# Patient Record
Sex: FEMALE | Race: WHITE | NOT HISPANIC OR LATINO | Employment: OTHER | URBAN - METROPOLITAN AREA
[De-identification: names, ages, dates, MRNs, and addresses within clinical notes are randomized per-mention and may not be internally consistent; named-entity substitution may affect disease eponyms.]

---

## 2022-04-19 ENCOUNTER — TELEPHONE (OUTPATIENT)
Dept: GASTROENTEROLOGY | Facility: CLINIC | Age: 52
End: 2022-04-19

## 2022-04-19 ENCOUNTER — OFFICE VISIT (OUTPATIENT)
Dept: GASTROENTEROLOGY | Facility: CLINIC | Age: 52
End: 2022-04-19

## 2022-04-19 VITALS
BODY MASS INDEX: 41.95 KG/M2 | SYSTOLIC BLOOD PRESSURE: 125 MMHG | HEIGHT: 70 IN | HEART RATE: 61 BPM | DIASTOLIC BLOOD PRESSURE: 79 MMHG | TEMPERATURE: 96.4 F | WEIGHT: 293 LBS

## 2022-04-19 DIAGNOSIS — R10.13 DYSPEPSIA: Primary | ICD-10-CM

## 2022-04-19 DIAGNOSIS — R14.0 BLOATING: ICD-10-CM

## 2022-04-19 PROCEDURE — 99204 OFFICE O/P NEW MOD 45 MIN: CPT | Performed by: INTERNAL MEDICINE

## 2022-04-19 RX ORDER — HYDROCHLOROTHIAZIDE 25 MG/1
1 TABLET ORAL
COMMUNITY
Start: 2022-04-19

## 2022-04-19 RX ORDER — BACILLUS COAGULANS/INULIN 1B-250 MG
CAPSULE ORAL
COMMUNITY

## 2022-04-19 RX ORDER — ROSUVASTATIN CALCIUM 20 MG/1
1 TABLET, COATED ORAL
COMMUNITY
Start: 2021-12-15

## 2022-04-19 RX ORDER — ICOSAPENT ETHYL 1000 MG/1
2 CAPSULE ORAL 2 TIMES DAILY WITH MEALS
COMMUNITY
Start: 2022-02-25

## 2022-04-19 RX ORDER — ASPIRIN 81 MG/1
1 TABLET, CHEWABLE ORAL
COMMUNITY

## 2022-04-19 RX ORDER — SERTRALINE HYDROCHLORIDE 100 MG/1
1 TABLET, FILM COATED ORAL
COMMUNITY
Start: 2021-12-15

## 2022-04-19 RX ORDER — DIPHENOXYLATE HYDROCHLORIDE AND ATROPINE SULFATE 2.5; .025 MG/1; MG/1
1 TABLET ORAL DAILY
COMMUNITY

## 2022-04-19 RX ORDER — PANTOPRAZOLE SODIUM 40 MG/1
40 TABLET, DELAYED RELEASE ORAL DAILY
Qty: 90 TABLET | Refills: 3 | Status: SHIPPED | OUTPATIENT
Start: 2022-04-19 | End: 2022-06-10 | Stop reason: SDUPTHER

## 2022-04-19 RX ORDER — METOPROLOL SUCCINATE 50 MG/1
1 TABLET, EXTENDED RELEASE ORAL NIGHTLY
COMMUNITY
Start: 2022-03-07

## 2022-04-19 RX ORDER — CETIRIZINE HYDROCHLORIDE 10 MG/1
CAPSULE, LIQUID FILLED ORAL
COMMUNITY

## 2022-04-19 NOTE — TELEPHONE ENCOUNTER
ROSE patient in office for EGD. Scheduled 06/13/2022 with arrival time 1:00pm. Prep packet handed to patient. Also advised arrival time may vary based on [facility] guidelines. ROSE [place]

## 2022-04-19 NOTE — PROGRESS NOTES
Chief Complaint   Patient presents with   • Abdominal Pain   • Nausea   • Heartburn     Ivania Mendoza is a 51 y.o. female who presents with a history of abdominal distention nausea and dyspepsia  HPI     Patient 51-year-old female with history of diagnosed sarcoidosis complaining of upper abdominal distention with bloating related to eating.  Patient reports no specific food causes the discomfort but almost anything she eats will cause the upper abdomen to become distended and tense with ongoing dyspepsia.  Patient noted with CT showing fatty liver with a single elevated ALT level.  No abdominal organ abnormalities found.  Colonoscopy in 2019 with benign polyps due for repeat in 2024.  Patient has noted some color change in her stool seems a little lighter than the normal brown but otherwise no change.    Past Medical History:   Diagnosis Date   • Sarcoidosis        Current Outpatient Medications:   •  Advair Diskus 250-50 MCG/DOSE DISKUS, , Disp: , Rfl:   •  aspirin 81 MG chewable tablet, Chew 1 tablet., Disp: , Rfl:   •  Bacillus Coagulans-Inulin (Probiotic) 1-250 BILLION-MG capsule, , Disp: , Rfl:   •  Cetirizine HCl (ZyrTEC Allergy) 10 MG capsule, Take  by mouth., Disp: , Rfl:   •  Cholecalciferol 25 MCG (1000 UT) capsule, Take 1 capsule by mouth Daily., Disp: , Rfl:   •  Coenzyme Q10 400 MG capsule, Take  by mouth., Disp: , Rfl:   •  Glycerin-Hypromellose- (ARTIFICIAL TEARS) 0.2-0.2-1 % solution ophthalmic solution, Apply  to eye(s) as directed by provider., Disp: , Rfl:   •  hydroCHLOROthiazide (HYDRODIURIL) 25 MG tablet, Take 1 tablet by mouth., Disp: , Rfl:   •  icosapent ethyl (VASCEPA) 1 g capsule capsule, Take 2 capsules by mouth 2 (Two) Times a Day With Meals., Disp: , Rfl:   •  metFORMIN (GLUCOPHAGE) 1000 MG tablet, Take 1 tablet by mouth., Disp: , Rfl:   •  metoprolol succinate XL (TOPROL-XL) 50 MG 24 hr tablet, Take 1 tablet by mouth., Disp: , Rfl:   •  multivitamin (THERAGRAN) tablet tablet,  Take 1 tablet by mouth Daily., Disp: , Rfl:   •  rosuvastatin (CRESTOR) 20 MG tablet, Take 1 tablet by mouth., Disp: , Rfl:   •  sertraline (ZOLOFT) 100 MG tablet, Take 1 tablet by mouth., Disp: , Rfl:   •  TURMERIC PO, Take  by mouth Daily., Disp: , Rfl:   •  pantoprazole (PROTONIX) 40 MG EC tablet, Take 1 tablet by mouth Daily., Disp: 90 tablet, Rfl: 3  No Known Allergies  Social History     Socioeconomic History   • Marital status:    Tobacco Use   • Smoking status: Former Smoker     Quit date:      Years since quittin.3   • Smokeless tobacco: Never Used   Substance and Sexual Activity   • Alcohol use: Never     Family History   Problem Relation Age of Onset   • Pancreatic cancer Father      Review of Systems   Constitutional: Negative.    HENT: Negative.    Eyes: Negative.    Respiratory: Negative.    Cardiovascular: Negative.    Gastrointestinal: Positive for abdominal distention and abdominal pain. Negative for anal bleeding, blood in stool, constipation, diarrhea, nausea, rectal pain and vomiting.   Endocrine: Negative.    Skin: Negative.    Allergic/Immunologic: Negative.    Hematological: Negative.      Vitals:    22 1232   BP: 125/79   Pulse: 61   Temp: 96.4 °F (35.8 °C)     Physical Exam  Vitals and nursing note reviewed.   Constitutional:       Appearance: Normal appearance. She is well-developed. She is obese.   HENT:      Head: Normocephalic and atraumatic.   Eyes:      General: No scleral icterus.     Pupils: Pupils are equal, round, and reactive to light.   Cardiovascular:      Rate and Rhythm: Normal rate and regular rhythm.      Heart sounds: Normal heart sounds. No murmur heard.    No friction rub. No gallop.   Pulmonary:      Effort: Pulmonary effort is normal.      Breath sounds: Normal breath sounds. No wheezing or rales.   Abdominal:      General: Bowel sounds are normal. There is no distension or abdominal bruit.      Palpations: Abdomen is soft. Abdomen is not rigid.  There is no shifting dullness, fluid wave, mass or pulsatile mass.      Tenderness: There is abdominal tenderness. There is no guarding.      Hernia: No hernia is present.   Musculoskeletal:         General: No swelling or tenderness. Normal range of motion.      Cervical back: Normal range of motion and neck supple. No rigidity.   Skin:     General: Skin is warm and dry.      Coloration: Skin is not jaundiced.      Findings: No rash.   Neurological:      General: No focal deficit present.      Mental Status: She is alert and oriented to person, place, and time.      Cranial Nerves: No cranial nerve deficit.   Psychiatric:         Behavior: Behavior normal.         Thought Content: Thought content normal.       Diagnoses and all orders for this visit:    Dyspepsia  -     Case Request; Standing  -     Case Request    Bloating    Other orders  -     aspirin 81 MG chewable tablet; Chew 1 tablet.  -     Bacillus Coagulans-Inulin (Probiotic) 1-250 BILLION-MG capsule  -     Cetirizine HCl (ZyrTEC Allergy) 10 MG capsule; Take  by mouth.  -     Cholecalciferol 25 MCG (1000 UT) capsule; Take 1 capsule by mouth Daily.  -     Coenzyme Q10 400 MG capsule; Take  by mouth.  -     Advair Diskus 250-50 MCG/DOSE DISKUS  -     Glycerin-Hypromellose- (ARTIFICIAL TEARS) 0.2-0.2-1 % solution ophthalmic solution; Apply  to eye(s) as directed by provider.  -     hydroCHLOROthiazide (HYDRODIURIL) 25 MG tablet; Take 1 tablet by mouth.  -     icosapent ethyl (VASCEPA) 1 g capsule capsule; Take 2 capsules by mouth 2 (Two) Times a Day With Meals.  -     metFORMIN (GLUCOPHAGE) 1000 MG tablet; Take 1 tablet by mouth.  -     metoprolol succinate XL (TOPROL-XL) 50 MG 24 hr tablet; Take 1 tablet by mouth.  -     multivitamin (THERAGRAN) tablet tablet; Take 1 tablet by mouth Daily.  -     rosuvastatin (CRESTOR) 20 MG tablet; Take 1 tablet by mouth.  -     sertraline (ZOLOFT) 100 MG tablet; Take 1 tablet by mouth.  -     TURMERIC PO; Take  by  mouth Daily.  -     pantoprazole (PROTONIX) 40 MG EC tablet; Take 1 tablet by mouth Daily.    Patient 51-year-old female with history of sarcoidosis complaining of upper abdominal bloating and discomfort.  Patient denies any real nausea but has occasional heartburn and reflux.  Patient denies any fevers or chills but does have a history of what was called GERD but recently taken off the PPI because of elevated renal function.  Patient reports renal function has not improved since stopping the PPI.  At this point would recommend proceeding with EGD but restart PPI pending endoscopy.  Patient has been on famotidine without improvement.  We will follow-up clinically with negative CT scan and a negative colonoscopy 2019 other issues unlikely.  We will follow-up clinically based on findings.

## 2022-06-07 ENCOUNTER — TRANSCRIBE ORDERS (OUTPATIENT)
Dept: ADMINISTRATIVE | Facility: HOSPITAL | Age: 52
End: 2022-06-07

## 2022-06-07 ENCOUNTER — TELEPHONE (OUTPATIENT)
Dept: GASTROENTEROLOGY | Facility: CLINIC | Age: 52
End: 2022-06-07

## 2022-06-07 DIAGNOSIS — Z01.818 PRE-OP TESTING: Primary | ICD-10-CM

## 2022-06-07 DIAGNOSIS — Z01.818 OTHER SPECIFIED PRE-OPERATIVE EXAMINATION: Primary | ICD-10-CM

## 2022-06-10 ENCOUNTER — LAB (OUTPATIENT)
Dept: LAB | Facility: HOSPITAL | Age: 52
End: 2022-06-10

## 2022-06-10 DIAGNOSIS — Z01.818 OTHER SPECIFIED PRE-OPERATIVE EXAMINATION: ICD-10-CM

## 2022-06-10 LAB — SARS-COV-2 ORF1AB RESP QL NAA+PROBE: NOT DETECTED

## 2022-06-10 PROCEDURE — U0004 COV-19 TEST NON-CDC HGH THRU: HCPCS

## 2022-06-10 PROCEDURE — C9803 HOPD COVID-19 SPEC COLLECT: HCPCS

## 2022-06-10 RX ORDER — NITROFURANTOIN 25; 75 MG/1; MG/1
100 CAPSULE ORAL
COMMUNITY
Start: 2022-04-25

## 2022-06-10 RX ORDER — LOSARTAN POTASSIUM 25 MG/1
25 TABLET ORAL NIGHTLY
COMMUNITY
Start: 2022-05-09

## 2022-06-10 RX ORDER — SERTRALINE HYDROCHLORIDE 100 MG/1
1 TABLET, FILM COATED ORAL DAILY
COMMUNITY
Start: 2022-06-03 | End: 2022-06-10 | Stop reason: SDUPTHER

## 2022-06-10 RX ORDER — PANTOPRAZOLE SODIUM 40 MG/1
1 TABLET, DELAYED RELEASE ORAL NIGHTLY
COMMUNITY
Start: 2022-04-19 | End: 2023-04-03

## 2022-06-13 ENCOUNTER — ANESTHESIA EVENT (OUTPATIENT)
Dept: GASTROENTEROLOGY | Facility: HOSPITAL | Age: 52
End: 2022-06-13

## 2022-06-13 ENCOUNTER — ANESTHESIA (OUTPATIENT)
Dept: GASTROENTEROLOGY | Facility: HOSPITAL | Age: 52
End: 2022-06-13

## 2022-06-13 ENCOUNTER — HOSPITAL ENCOUNTER (OUTPATIENT)
Facility: HOSPITAL | Age: 52
Setting detail: HOSPITAL OUTPATIENT SURGERY
Discharge: HOME | End: 2022-06-13
Attending: INTERNAL MEDICINE | Admitting: INTERNAL MEDICINE

## 2022-06-13 VITALS
OXYGEN SATURATION: 94 % | RESPIRATION RATE: 16 BRPM | SYSTOLIC BLOOD PRESSURE: 110 MMHG | WEIGHT: 293 LBS | BODY MASS INDEX: 41.95 KG/M2 | HEIGHT: 70 IN | HEART RATE: 63 BPM | DIASTOLIC BLOOD PRESSURE: 74 MMHG

## 2022-06-13 DIAGNOSIS — R10.13 DYSPEPSIA: ICD-10-CM

## 2022-06-13 LAB — GLUCOSE BLDC GLUCOMTR-MCNC: 127 MG/DL (ref 70–130)

## 2022-06-13 PROCEDURE — 25010000002 PROPOFOL 10 MG/ML EMULSION: Performed by: ANESTHESIOLOGY

## 2022-06-13 PROCEDURE — 82962 GLUCOSE BLOOD TEST: CPT

## 2022-06-13 PROCEDURE — 43239 EGD BIOPSY SINGLE/MULTIPLE: CPT | Performed by: INTERNAL MEDICINE

## 2022-06-13 PROCEDURE — 88342 IMHCHEM/IMCYTCHM 1ST ANTB: CPT | Performed by: INTERNAL MEDICINE

## 2022-06-13 PROCEDURE — 88305 TISSUE EXAM BY PATHOLOGIST: CPT | Performed by: INTERNAL MEDICINE

## 2022-06-13 PROCEDURE — S0260 H&P FOR SURGERY: HCPCS | Performed by: INTERNAL MEDICINE

## 2022-06-13 RX ORDER — PROPOFOL 10 MG/ML
VIAL (ML) INTRAVENOUS AS NEEDED
Status: DISCONTINUED | OUTPATIENT
Start: 2022-06-13 | End: 2022-06-13 | Stop reason: SURG

## 2022-06-13 RX ORDER — PROPOFOL 10 MG/ML
VIAL (ML) INTRAVENOUS CONTINUOUS PRN
Status: DISCONTINUED | OUTPATIENT
Start: 2022-06-13 | End: 2022-06-13 | Stop reason: SURG

## 2022-06-13 RX ORDER — SODIUM CHLORIDE 0.9 % (FLUSH) 0.9 %
10 SYRINGE (ML) INJECTION EVERY 12 HOURS SCHEDULED
Status: DISCONTINUED | OUTPATIENT
Start: 2022-06-13 | End: 2022-06-13 | Stop reason: HOSPADM

## 2022-06-13 RX ORDER — LIDOCAINE HYDROCHLORIDE 20 MG/ML
INJECTION, SOLUTION INFILTRATION; PERINEURAL AS NEEDED
Status: DISCONTINUED | OUTPATIENT
Start: 2022-06-13 | End: 2022-06-13 | Stop reason: SURG

## 2022-06-13 RX ORDER — SODIUM CHLORIDE, SODIUM LACTATE, POTASSIUM CHLORIDE, CALCIUM CHLORIDE 600; 310; 30; 20 MG/100ML; MG/100ML; MG/100ML; MG/100ML
30 INJECTION, SOLUTION INTRAVENOUS CONTINUOUS PRN
Status: DISCONTINUED | OUTPATIENT
Start: 2022-06-13 | End: 2022-06-13 | Stop reason: HOSPADM

## 2022-06-13 RX ORDER — SODIUM CHLORIDE 0.9 % (FLUSH) 0.9 %
10 SYRINGE (ML) INJECTION AS NEEDED
Status: DISCONTINUED | OUTPATIENT
Start: 2022-06-13 | End: 2022-06-13 | Stop reason: HOSPADM

## 2022-06-13 RX ADMIN — Medication 200 MCG/KG/MIN: at 14:37

## 2022-06-13 RX ADMIN — SODIUM CHLORIDE, POTASSIUM CHLORIDE, SODIUM LACTATE AND CALCIUM CHLORIDE 30 ML/HR: 600; 310; 30; 20 INJECTION, SOLUTION INTRAVENOUS at 13:42

## 2022-06-13 RX ADMIN — LIDOCAINE HYDROCHLORIDE 60 MG: 20 INJECTION, SOLUTION INFILTRATION; PERINEURAL at 14:37

## 2022-06-13 RX ADMIN — PROPOFOL 100 MG: 10 INJECTION, EMULSION INTRAVENOUS at 14:37

## 2022-06-13 NOTE — ANESTHESIA POSTPROCEDURE EVALUATION
"Patient: Ivania Mendoza    Procedure Summary     Date: 06/13/22 Room / Location:  [facility] /  [facility]    Anesthesia Start: 1430 Anesthesia Stop: 1447    Procedure: ESOPHAGOGASTRODUODENOSCOPY with bx (N/A Esophagus) Diagnosis:       Dyspepsia      Gastritis      (Dyspepsia [R10.13])    Surgeons: Eduardo Youssef MD Provider: Shannan Trujillo MD    Anesthesia Type: MAC ASA Status: 3          Anesthesia Type: MAC    Vitals  Vitals Value Taken Time   /74 06/13/22 1507   Temp     Pulse 63 06/13/22 1507   Resp 16 06/13/22 1507   SpO2 94 % 06/13/22 1507           Post Anesthesia Care and Evaluation    Patient location during evaluation: PHASE II  Patient participation: complete - patient participated  Level of consciousness: awake  Pain management: adequate    Airway patency: patent  Anesthetic complications: No anesthetic complications    Cardiovascular status: acceptable  Respiratory status: acceptable  Hydration status: acceptable    Comments: /74 (BP Location: Left arm, Patient Position: Lying)   Pulse 63   Resp 16   Ht 177.8 cm (70\")   Wt (!) 148 kg (327 lb)   SpO2 94%   BMI 46.92 kg/m²       "

## 2022-06-13 NOTE — BRIEF OP NOTE
ESOPHAGOGASTRODUODENOSCOPY  Progress Note    Ivania Mendoza  6/13/2022    Pre-op Diagnosis:   Dyspepsia [R10.13]       Post-Op Diagnosis Codes:     * Dyspepsia [R10.13]     * Gastritis [K29.70]    Procedure/CPT® Codes:        Procedure(s):  ESOPHAGOGASTRODUODENOSCOPY with bx    Surgeon(s):  Eduardo Youssef MD    Anesthesia: Monitored Anesthesia Care    Staff:   Endo Technician: Gloria Celestin  Endo Nurse: Cathy Ryan RN         Estimated Blood Loss: minimal    Urine Voided: * No values recorded between 6/13/2022  2:29 PM and 6/13/2022  2:41 PM *    Specimens:                Specimens     ID Source Type Tests Collected By Collected At Frozen?    A Stomach Tissue · TISSUE PATHOLOGY EXAM   Eduardo Youssef MD 6/13/22 1440     Description: antrum bx     This specimen was not marked as sent.                Drains: * No LDAs found *    Findings: EGD with normal esophagus throughout antral gastritis was identified biopsies taken.  Retroflexed view the GE junction was normal with normal duodenal mucosa.        Complications: None          Eduardo Youssef MD     Date: 6/13/2022  Time: 14:42 EDT

## 2022-06-13 NOTE — DISCHARGE INSTRUCTIONS
For the next 24 hours patient needs to be with a responsible adult.    For 24 hours DO NOT drive, operate machinery, appliances, drink alcohol, make important decisions or sign legal documents.    Start with a light or bland diet if you are feeling sick to your stomach otherwise advance to regular diet as tolerated.    Follow recommendations on procedure report if provided by your doctor.    Call Dr Youssef for problems [phone]    Problems may include but not limited to: large amounts of bleeding, trouble breathing, repeated vomiting, severe unrelieved pain, fever or chills.

## 2022-06-13 NOTE — H&P
[facility]  Pre Procedure History & Physical    Chief Complaint:   Dyspepsia with nausea and vomiting    Subjective     HPI:   Patient 51-year-old female with history of sarcoidosis and obstructive sleep apnea presenting with complaints of dyspepsia with nausea and vomiting.  Patient was stopped on PPI due to decreasing renal function but no improvement was noted when came off PPI.  Since symptoms got worse not responsive to Pepcid patient went back on PPI and reports her kidney functions are stable but all of her GI symptoms have resolved now here for EGD.    Past Medical History:   Past Medical History:   Diagnosis Date   • Dyspepsia    • PONV (postoperative nausea and vomiting)    • Sarcoidosis    • Sleep apnea     JANES w/ home cpap       Past Surgical History:  Past Surgical History:   Procedure Laterality Date   • CHOLECYSTECTOMY     • COLONOSCOPY  2019    benign polyps per pt   • EYE SURGERY      corrective vision   • HYSTERECTOMY         Family History:  Family History   Problem Relation Age of Onset   • Pancreatic cancer Father    • Malig Hyperthermia Neg Hx        Social History:   reports that she quit smoking about 22 years ago. She has never used smokeless tobacco. She reports that she does not drink alcohol and does not use drugs.    Medications:   Medications Prior to Admission   Medication Sig Dispense Refill Last Dose   • Advair Diskus 250-50 MCG/DOSE DISKUS    6/12/2022 at Unknown time   • ascorbic acid (VITAMIN C) 1000 MG tablet Take 1,000 mg by mouth Every Night.   6/12/2022 at Unknown time   • aspirin 81 MG chewable tablet Chew 1 tablet.      • Bacillus Coagulans-Inulin (Probiotic) 1-250 BILLION-MG capsule    6/12/2022 at Unknown time   • Cetirizine HCl (ZyrTEC Allergy) 10 MG capsule Take  by mouth.   6/12/2022 at Unknown time   • Cholecalciferol 25 MCG (1000 UT) capsule Take 1 capsule by mouth Daily.   6/12/2022 at Unknown time   • Coenzyme Q10 400 MG capsule Take  by  "mouth.   6/12/2022 at Unknown time   • hydroCHLOROthiazide (HYDRODIURIL) 25 MG tablet Take 1 tablet by mouth.   6/12/2022 at Unknown time   • icosapent ethyl (VASCEPA) 1 g capsule capsule Take 2 capsules by mouth 2 (Two) Times a Day With Meals.   6/12/2022 at Unknown time   • losartan (COZAAR) 25 MG tablet Take 25 mg by mouth Every Night.   6/12/2022 at Unknown time   • metFORMIN (GLUCOPHAGE) 1000 MG tablet Take 1 tablet by mouth.   6/12/2022 at Unknown time   • metoprolol succinate XL (TOPROL-XL) 50 MG 24 hr tablet Take 1 tablet by mouth Every Night.   6/12/2022 at Unknown time   • multivitamin (THERAGRAN) tablet tablet Take 1 tablet by mouth Daily.   6/12/2022 at Unknown time   • pantoprazole (PROTONIX) 40 MG EC tablet Take 1 tablet by mouth Every Night.   6/12/2022 at Unknown time   • rosuvastatin (CRESTOR) 20 MG tablet Take 1 tablet by mouth.   6/12/2022 at Unknown time   • sertraline (ZOLOFT) 100 MG tablet Take 1 tablet by mouth.   6/12/2022 at Unknown time   • TURMERIC PO Take  by mouth Daily.   6/12/2022 at Unknown time   • Glycerin-Hypromellose- (ARTIFICIAL TEARS) 0.2-0.2-1 % solution ophthalmic solution Apply  to eye(s) as directed by provider.   More than a month at Unknown time   • nitrofurantoin, macrocrystal-monohydrate, (MACROBID) 100 MG capsule Take 100 mg by mouth.   More than a month at Unknown time       Allergies:  Patient has no known allergies.    ROS:    Pertinent items are noted in HPI     Objective     Blood pressure 112/53, pulse 63, resp. rate 18, height 177.8 cm (70\"), weight (!) 148 kg (327 lb), SpO2 96 %.    Physical Exam   Constitutional: Pt is oriented to person, place, and time and well-developed, well-nourished, and in no distress.   Mouth/Throat: Oropharynx is clear and moist.   Neck: Normal range of motion.   Cardiovascular: Normal rate, regular rhythm and normal heart sounds.    Pulmonary/Chest: Effort normal and breath sounds normal.   Abdominal: Soft. Nontender  Skin: " Skin is warm and dry.   Psychiatric: Mood, memory, affect and judgment normal.     Assessment & Plan     Diagnosis:  GERD    Anticipated Surgical Procedure:  EGD    The risks, benefits, and alternatives of this procedure have been discussed with the patient or the responsible party- the patient understands and agrees to proceed.

## 2022-06-13 NOTE — ANESTHESIA PREPROCEDURE EVALUATION
Anesthesia Evaluation     Patient summary reviewed and Nursing notes reviewed   history of anesthetic complications: PONV  NPO Solid Status: > 8 hours  NPO Liquid Status: > 2 hours           Airway   Mallampati: II  Dental - normal exam     Comment: Risk of dental injury discussed with patient    Pulmonary - normal exam   (+) sleep apnea on CPAP,   Cardiovascular - negative cardio ROS and normal exam      ROS comment: Reviewed echo 8/20 :     Summary    Normal LV systolic function    Trace mitral and tricuspid regurgitation    Borderline dimension of the ascending aorta at 3.7 cm        Neuro/Psych- negative ROS  GI/Hepatic/Renal/Endo    (+) morbid obesity,      Musculoskeletal (-) negative ROS    Abdominal   (+) obese,    Substance History - negative use     OB/GYN          Other - negative ROS                     Anesthesia Plan    ASA 3     MAC       Anesthetic plan, risks, benefits, and alternatives have been provided, discussed and informed consent has been obtained with: patient.        CODE STATUS:

## 2022-06-15 LAB
LAB AP CASE REPORT: NORMAL
PATH REPORT.FINAL DX SPEC: NORMAL
PATH REPORT.GROSS SPEC: NORMAL

## 2022-06-30 ENCOUNTER — TELEPHONE (OUTPATIENT)
Dept: GASTROENTEROLOGY | Facility: CLINIC | Age: 52
End: 2022-06-30

## 2023-04-03 RX ORDER — PANTOPRAZOLE SODIUM 40 MG/1
TABLET, DELAYED RELEASE ORAL
Qty: 90 TABLET | Refills: 3 | Status: SHIPPED | OUTPATIENT
Start: 2023-04-03

## 2023-07-13 PROBLEM — K29.70 GASTRITIS: Status: ACTIVE | Noted: 2023-07-13

## 2024-07-08 RX ORDER — FAMOTIDINE 40 MG/1
40 TABLET, FILM COATED ORAL NIGHTLY
Qty: 90 TABLET | Refills: 3 | Status: SHIPPED | OUTPATIENT
Start: 2024-07-08

## 2025-03-27 ENCOUNTER — OFFICE VISIT (OUTPATIENT)
Dept: GASTROENTEROLOGY | Facility: CLINIC | Age: 55
End: 2025-03-27
Payer: COMMERCIAL

## 2025-03-27 ENCOUNTER — TELEPHONE (OUTPATIENT)
Dept: GASTROENTEROLOGY | Facility: CLINIC | Age: 55
End: 2025-03-27
Payer: COMMERCIAL

## 2025-03-27 VITALS
TEMPERATURE: 96.8 F | HEART RATE: 66 BPM | WEIGHT: 293 LBS | HEIGHT: 70 IN | SYSTOLIC BLOOD PRESSURE: 109 MMHG | DIASTOLIC BLOOD PRESSURE: 74 MMHG | BODY MASS INDEX: 41.95 KG/M2

## 2025-03-27 DIAGNOSIS — K58.0 IRRITABLE BOWEL SYNDROME WITH DIARRHEA: ICD-10-CM

## 2025-03-27 DIAGNOSIS — R10.10 UPPER ABDOMINAL PAIN: Primary | ICD-10-CM

## 2025-03-27 DIAGNOSIS — R11.0 NAUSEA: ICD-10-CM

## 2025-03-27 DIAGNOSIS — R12 HEARTBURN: ICD-10-CM

## 2025-03-27 DIAGNOSIS — Z87.19 HISTORY OF GASTRITIS: ICD-10-CM

## 2025-03-27 PROCEDURE — 99214 OFFICE O/P EST MOD 30 MIN: CPT | Performed by: NURSE PRACTITIONER

## 2025-03-27 RX ORDER — TIRZEPATIDE 7.5 MG/.5ML
INJECTION, SOLUTION SUBCUTANEOUS
COMMUNITY
Start: 2025-01-31

## 2025-03-27 RX ORDER — FAMOTIDINE 40 MG/1
40 TABLET, FILM COATED ORAL 2 TIMES DAILY
Qty: 180 TABLET | Refills: 3 | Status: SHIPPED | OUTPATIENT
Start: 2025-03-27

## 2025-03-27 RX ORDER — LOSARTAN POTASSIUM 25 MG/1
TABLET ORAL
COMMUNITY

## 2025-03-27 RX ORDER — BUDESONIDE AND FORMOTEROL FUMARATE DIHYDRATE 160; 4.5 UG/1; UG/1
AEROSOL RESPIRATORY (INHALATION)
COMMUNITY

## 2025-03-27 RX ORDER — LAMOTRIGINE 25 MG/1
25 TABLET ORAL DAILY
COMMUNITY
Start: 2025-02-20

## 2025-03-27 NOTE — PROGRESS NOTES
"Chief Complaint   Patient presents with    Abdominal Pain       HPI    Ivania Mendoza is a  54 y.o. female here for a follow up visit for abdominal pain.    Past medical history of CKD, diabetes, fatty liver, hyperlipidemia, hypertension, sarcoidosis along with sleep apnea.    On visit today patient reports several months of upper abdominal discomfort that comes and goes does not always correlate with eating.  Discomfort described as a \"lightninglike sensation.\"  She reports increased heartburn and persistent nausea.  No vomiting.  No dysphagia or odynophagia.  She is currently taking Pepcid 40 mg once daily.  She has been trying to avoid PPI therapy given her history of chronic kidney disease but has felt the best in the past on Protonix.    She has a history of irritable bowel syndrome diarrhea predominant.  Diarrhea symptoms wax and wane.  She has 1-2 bowel movements a day on average.  No rectal bleeding or rectal pain.    Hemoglobin, TSH and LFTs normal.    Endoscopic history reviewed:    COLONOSCOPY (05/21/2024 09:05) - Her last colonoscopy was May 2024 ([facility]) performed for screening purposes with findings of polyp and diverticulosis.  Recall 5 years.    UPPER GI ENDOSCOPY (06/13/2022 14:27) - Gastritis otherwise normal.  Path was benign.    Past Medical History:   Diagnosis Date    Cholelithiasis     Chronic kidney disease     Colon polyp     Diabetes mellitus     Dyspepsia     Fatty liver     GERD (gastroesophageal reflux disease)     Hyperlipidemia     Hypertension     PONV (postoperative nausea and vomiting)     Sarcoidosis     Sleep apnea     JANES w/ home cpap       Past Surgical History:   Procedure Laterality Date    CHOLECYSTECTOMY      COLONOSCOPY  2019    benign polyps per pt    ENDOSCOPY N/A 06/13/2022    Procedure: ESOPHAGOGASTRODUODENOSCOPY with bx;  Surgeon: Eduardo Youssef MD;  Location: [facility] END[facility];  Service: Gastroenterology;  Laterality: N/A;  pre- dyspepsia   post- gastritis    EYE " SURGERY      corrective vision    HYSTERECTOMY      UPPER GASTROINTESTINAL ENDOSCOPY         Scheduled Meds:     Continuous Infusions: No current facility-administered medications for this visit.      PRN Meds:     No Known Allergies    Social History     Socioeconomic History    Marital status:    Tobacco Use    Smoking status: Former     Current packs/day: 0.00     Average packs/day: 0.5 packs/day for 15.0 years (7.5 ttl pk-yrs)     Types: Cigarettes     Quit date:      Years since quittin.2    Smokeless tobacco: Never   Vaping Use    Vaping status: Never Used   Substance and Sexual Activity    Alcohol use: Never    Drug use: Never    Sexual activity: Yes     Partners: Male     Birth control/protection: Post-menopausal, Hysterectomy       Family History   Problem Relation Age of Onset    Pancreatic cancer Father     Malig Hyperthermia Neg Hx        Review of Systems   Gastrointestinal:  Positive for abdominal pain and nausea.       Vitals:    25 0953   BP: 109/74   Pulse: 66   Temp: 96.8 °F (36 °C)       Physical Exam  Constitutional:       Appearance: She is well-developed.   Abdominal:      General: Bowel sounds are normal. There is no distension.      Palpations: Abdomen is soft. There is no mass.      Tenderness: There is no abdominal tenderness. There is no guarding.      Hernia: No hernia is present.   Skin:     General: Skin is warm and dry.      Capillary Refill: Capillary refill takes less than 2 seconds.   Neurological:      Mental Status: She is alert and oriented to person, place, and time.   Psychiatric:         Behavior: Behavior normal.     Assessment    Diagnoses and all orders for this visit:    1. Upper abdominal pain (Primary)    2. Heartburn    3. Nausea    4. History of gastritis    5. Irritable bowel syndrome with diarrhea    Other orders  -     famotidine (Pepcid) 40 MG tablet; Take 1 tablet by mouth 2 (Two) Times a Day.  Dispense: 180 tablet; Refill:  3    Plan    Schedule EGD with Dr. Boles  Risk-benefit of procedure reviewed the patient  Increase Pepcid to twice daily dosing  Antireflux measures and dietary modifications reviewed. Low acid diet reviewed. Keep head of bed elevated. Stop eating/drinking at least 3 hours prior to bedtime. Eliminate caffeine and carbonated beverages.  Weight loss encouraged if BMI over 25.  Stable IBS-D  Follow-up and further recommendations pending procedural findings         TAMMIE Becerra  [facility], [facility]  [address] [place] [address]  Office: [phone]

## 2025-04-16 ENCOUNTER — TELEPHONE (OUTPATIENT)
Dept: GASTROENTEROLOGY | Facility: CLINIC | Age: 55
End: 2025-04-16
Payer: COMMERCIAL

## 2025-04-16 NOTE — TELEPHONE ENCOUNTER
"  Caller: Ivania Mendoza \"Clara\"    Relationship: Self    Best call back number: [phone]    What is the best time to reach you: ANYTIME    Who are you requesting to speak with (clinical staff, provider,  specific staff member):     Do you know the name of the person who called: PATIENT    What was the call regarding: PT IS SCHEDULED FOR AN EGD ON 06/02/25.  SHE WANTS TO KNOW IF THERE IS A WAITING LIST FOR PROCEDURES.  SHE WOULD REALLY LIKED TO SCHEDULE THIS FOR AN EARLIER DATE.   PLEASE CALL HER BACK  "

## 2025-05-29 RX ORDER — FLUVOXAMINE MALEATE 25 MG
25 TABLET ORAL DAILY
COMMUNITY
Start: 2025-04-08

## 2025-06-02 ENCOUNTER — ANESTHESIA EVENT (OUTPATIENT)
Dept: GASTROENTEROLOGY | Facility: HOSPITAL | Age: 55
End: 2025-06-02
Payer: COMMERCIAL

## 2025-06-02 ENCOUNTER — ANESTHESIA (OUTPATIENT)
Dept: GASTROENTEROLOGY | Facility: HOSPITAL | Age: 55
End: 2025-06-02
Payer: COMMERCIAL

## 2025-06-02 ENCOUNTER — HOSPITAL ENCOUNTER (OUTPATIENT)
Facility: HOSPITAL | Age: 55
Setting detail: HOSPITAL OUTPATIENT SURGERY
Discharge: HOME | End: 2025-06-02
Attending: INTERNAL MEDICINE | Admitting: INTERNAL MEDICINE
Payer: COMMERCIAL

## 2025-06-02 VITALS
SYSTOLIC BLOOD PRESSURE: 111 MMHG | DIASTOLIC BLOOD PRESSURE: 66 MMHG | WEIGHT: 293 LBS | HEART RATE: 59 BPM | BODY MASS INDEX: 41.95 KG/M2 | HEIGHT: 70 IN | OXYGEN SATURATION: 96 % | RESPIRATION RATE: 16 BRPM

## 2025-06-02 DIAGNOSIS — R11.0 NAUSEA: ICD-10-CM

## 2025-06-02 DIAGNOSIS — R12 HEARTBURN: ICD-10-CM

## 2025-06-02 DIAGNOSIS — R10.10 UPPER ABDOMINAL PAIN: ICD-10-CM

## 2025-06-02 DIAGNOSIS — Z87.19 HISTORY OF GASTRITIS: ICD-10-CM

## 2025-06-02 LAB — GLUCOSE BLDC GLUCOMTR-MCNC: 109 MG/DL (ref 70–130)

## 2025-06-02 PROCEDURE — 88305 TISSUE EXAM BY PATHOLOGIST: CPT | Performed by: INTERNAL MEDICINE

## 2025-06-02 PROCEDURE — 25010000002 PROPOFOL 10 MG/ML EMULSION: Performed by: NURSE ANESTHETIST, CERTIFIED REGISTERED

## 2025-06-02 PROCEDURE — 82948 REAGENT STRIP/BLOOD GLUCOSE: CPT

## 2025-06-02 PROCEDURE — 88342 IMHCHEM/IMCYTCHM 1ST ANTB: CPT | Performed by: INTERNAL MEDICINE

## 2025-06-02 PROCEDURE — 43239 EGD BIOPSY SINGLE/MULTIPLE: CPT | Performed by: INTERNAL MEDICINE

## 2025-06-02 PROCEDURE — 88312 SPECIAL STAINS GROUP 1: CPT | Performed by: INTERNAL MEDICINE

## 2025-06-02 PROCEDURE — 25810000003 LACTATED RINGERS PER 1000 ML: Performed by: INTERNAL MEDICINE

## 2025-06-02 RX ORDER — PROPOFOL 10 MG/ML
VIAL (ML) INTRAVENOUS AS NEEDED
Status: DISCONTINUED | OUTPATIENT
Start: 2025-06-02 | End: 2025-06-02 | Stop reason: SURG

## 2025-06-02 RX ORDER — SODIUM CHLORIDE 0.9 % (FLUSH) 0.9 %
10 SYRINGE (ML) INJECTION AS NEEDED
Status: DISCONTINUED | OUTPATIENT
Start: 2025-06-02 | End: 2025-06-02 | Stop reason: HOSPADM

## 2025-06-02 RX ORDER — SODIUM CHLORIDE 0.9 % (FLUSH) 0.9 %
10 SYRINGE (ML) INJECTION EVERY 12 HOURS SCHEDULED
Status: DISCONTINUED | OUTPATIENT
Start: 2025-06-02 | End: 2025-06-02 | Stop reason: HOSPADM

## 2025-06-02 RX ORDER — SODIUM CHLORIDE 9 MG/ML
40 INJECTION, SOLUTION INTRAVENOUS AS NEEDED
Status: DISCONTINUED | OUTPATIENT
Start: 2025-06-02 | End: 2025-06-02 | Stop reason: HOSPADM

## 2025-06-02 RX ORDER — SODIUM CHLORIDE, SODIUM LACTATE, POTASSIUM CHLORIDE, CALCIUM CHLORIDE 600; 310; 30; 20 MG/100ML; MG/100ML; MG/100ML; MG/100ML
30 INJECTION, SOLUTION INTRAVENOUS CONTINUOUS PRN
Status: DISCONTINUED | OUTPATIENT
Start: 2025-06-02 | End: 2025-06-02 | Stop reason: HOSPADM

## 2025-06-02 RX ADMIN — PROPOFOL 100 MG: 10 INJECTION, EMULSION INTRAVENOUS at 13:17

## 2025-06-02 RX ADMIN — PROPOFOL 20 MG: 10 INJECTION, EMULSION INTRAVENOUS at 13:23

## 2025-06-02 RX ADMIN — PROPOFOL 20 MG: 10 INJECTION, EMULSION INTRAVENOUS at 13:20

## 2025-06-02 RX ADMIN — SODIUM CHLORIDE, POTASSIUM CHLORIDE, SODIUM LACTATE AND CALCIUM CHLORIDE 30 ML/HR: 600; 310; 30; 20 INJECTION, SOLUTION INTRAVENOUS at 12:58

## 2025-06-02 NOTE — H&P
[facility]  Pre Procedure History & Physical    Chief Complaint:   Upper abdominal pain    Subjective     HPI:   54 y.o. female here for EGD for epigastric pain    Past Medical History:   Past Medical History:   Diagnosis Date    Cholelithiasis     Chronic kidney disease     STAGE 3B    Colon polyp     Diabetes mellitus     Dyspepsia     Fatty liver     GERD (gastroesophageal reflux disease)     Hyperlipidemia     Hypertension     PONV (postoperative nausea and vomiting)     Sarcoidosis     Sleep apnea     JANES w/ home cpap       Past Surgical History:  Past Surgical History:   Procedure Laterality Date    CHOLECYSTECTOMY      COLONOSCOPY  2019    benign polyps per pt    ENDOSCOPY N/A 06/13/2022    Procedure: ESOPHAGOGASTRODUODENOSCOPY with bx;  Surgeon: Eduardo Youssef MD;  Location: [facility] ENDOSCOPY;  Service: Gastroenterology;  Laterality: N/A;  pre- dyspepsia   post- gastritis    EYE SURGERY      corrective vision    HYSTERECTOMY      UPPER GASTROINTESTINAL ENDOSCOPY         Family History:  Family History   Problem Relation Age of Onset    Pancreatic cancer Father     Christ Hyperthermia Neg Hx        Social History:   reports that she quit smoking about 25 years ago. Her smoking use included cigarettes. She has a 7.5 pack-year smoking history. She has never used smokeless tobacco. She reports that she does not drink alcohol and does not use drugs.    Medications:   Medications Prior to Admission   Medication Sig Dispense Refill Last Dose/Taking    ascorbic acid (VITAMIN C) 1000 MG tablet Take 1 tablet by mouth 1 (One) Time Per Week.   Taking    Bacillus Coagulans-Inulin (Probiotic) 1-250 BILLION-MG capsule Daily.   Taking    budesonide-formoterol (Symbicort) 160-4.5 MCG/ACT inhaler Inhale 2 puffs Daily.   Taking    buPROPion XL (WELLBUTRIN XL) 150 MG 24 hr tablet Take 1 tablet by mouth Every Morning.   Taking    Cetirizine HCl (ZyrTEC Allergy) 10 MG capsule Take  by mouth Daily.    Taking    Cholecalciferol 25 MCG (1000 UT) capsule Take 1 capsule by mouth Daily.   Taking    Coenzyme Q10 400 MG capsule Take  by mouth Daily.   Taking    famotidine (Pepcid) 40 MG tablet Take 1 tablet by mouth 2 (Two) Times a Day. 180 tablet 3 Taking    fluvoxaMINE (LUVOX) 25 MG tablet Take 1 tablet by mouth Daily.   Taking    glipizide (GLUCOTROL XL) 2.5 MG 24 hr tablet Take 1 tablet by mouth Daily.   Taking    Glycerin-Hypromellose- (ARTIFICIAL TEARS) 0.2-0.2-1 % solution ophthalmic solution Apply  to eye(s) as directed by provider.   Taking    hydroCHLOROthiazide (HYDRODIURIL) 25 MG tablet Take 1 tablet by mouth Daily.   Taking    icosapent ethyl (VASCEPA) 1 g capsule capsule Take 2 g by mouth 2 (Two) Times a Day With Meals.   Taking    losartan (COZAAR) 25 MG tablet Daily. HOLD DOS   Taking    metFORMIN (GLUCOPHAGE) 1000 MG tablet Take 1 tablet by mouth Daily With Breakfast.   Taking    metoprolol succinate XL (TOPROL-XL) 50 MG 24 hr tablet Take 1 tablet by mouth Every Night.   Taking    Mounjaro 7.5 MG/0.5ML solution auto-injector HELD AS DIRECTED   5/21/2025    multivitamin (THERAGRAN) tablet tablet Take 1 tablet by mouth Daily.   Taking    nitrofurantoin, macrocrystal-monohydrate, (MACROBID) 100 MG capsule Take 1 capsule by mouth Daily As Needed (KIDNEY FLAREUP).   Taking As Needed    ondansetron (ZOFRAN) 4 MG tablet Take 1 tablet by mouth Every 8 (Eight) Hours As Needed for Nausea or Vomiting.   Taking As Needed    rosuvastatin (CRESTOR) 20 MG tablet Take 1 tablet by mouth Every Night.   Taking    sertraline (ZOLOFT) 100 MG tablet Take 0.5 tablets by mouth Daily.   5/29/2025    TURMERIC PO Take  by mouth Daily.   Taking       Allergies:  Patient has no known allergies.    ROS:    Pertinent items are noted in HPI     Objective     There were no vitals taken for this visit.    Physical Exam   Constitutional: Pt is oriented to person, place, and time and well-developed, well-nourished, and in no  distress.   Mouth/Throat: Oropharynx is clear and moist.   Neck: Normal range of motion.   Cardiovascular: Normal rate, regular rhythm and normal heart sounds.    Pulmonary/Chest: Effort normal and breath sounds normal.   Abdominal: Soft. Nontender  Skin: Skin is warm and dry.   Psychiatric: Mood, memory, affect and judgment normal.     Assessment & Plan     Diagnosis:  Epigastric pain    Anticipated Surgical Procedure:  EGD    The risks, benefits, and alternatives of this procedure have been discussed with the patient or the responsible party- the patient understands and agrees to proceed.

## 2025-06-02 NOTE — DISCHARGE INSTRUCTIONS
For the next 24 hours patient needs to be with a responsible adult.    For 24 hours DO NOT drive, operate machinery, appliances, drink alcohol, make important decisions or sign legal documents.    Start with a light or bland diet if you are feeling sick to your stomach otherwise advance to regular diet as tolerated.    Follow recommendations on procedure report if provided by your doctor.    Call Dr Boles for problems [phone]    Problems may include but not limited to: large amounts of bleeding, trouble breathing, repeated vomiting, severe unrelieved pain, fever or chills.

## 2025-06-02 NOTE — ANESTHESIA PREPROCEDURE EVALUATION
Anesthesia Evaluation     Patient summary reviewed and Nursing notes reviewed   history of anesthetic complications:  PONV  NPO Solid Status: > 8 hours  NPO Liquid Status: > 8 hours           Airway   Mallampati: II  Neck ROM: full  No difficulty expected  Dental - normal exam     Pulmonary     breath sounds clear to auscultation  (+) a smoker Former,sleep apnea  Cardiovascular     Rhythm: regular    (+) hypertension, hyperlipidemia      Neuro/Psych  GI/Hepatic/Renal/Endo    (+) obesity, morbid obesity, GERD, liver disease fatty liver disease, renal disease-, diabetes mellitus    ROS Comment: 308 lbs BMI 44.3    Musculoskeletal     Abdominal   (+) obese   Substance History      OB/GYN          Other          Other Comment: sarcoidosis              Anesthesia Plan    ASA 3     MAC     intravenous induction     Anesthetic plan, risks, benefits, and alternatives have been provided, discussed and informed consent has been obtained with: patient.    CODE STATUS:

## 2025-06-02 NOTE — ANESTHESIA POSTPROCEDURE EVALUATION
"Patient: Ivania Mendoza    Procedure Summary       Date: 06/02/25 Room / Location: [facility] ENDOSCOPY 10 / [facility] ENDOSCOPY    Anesthesia Start: 1313 Anesthesia Stop: 1331    Procedure: ESOPHAGOGASTRODUODENOSCOPY with biopsies (Esophagus) Diagnosis:       Upper abdominal pain      Heartburn      Nausea      History of gastritis      (Upper abdominal pain [R10.10])      (Heartburn [R12])      (Nausea [R11.0])      (History of gastritis [Z87.19])    Surgeons: Prosper Boles MD Provider: Fausto Arrington MD    Anesthesia Type: MAC ASA Status: 3            Anesthesia Type: MAC    Vitals  Vitals Value Taken Time   /66 06/02/25 13:50   Temp     Pulse 61 06/02/25 13:53   Resp 16 06/02/25 13:50   SpO2 98 % 06/02/25 13:53   Vitals shown include unfiled device data.        Post Anesthesia Care and Evaluation    Patient location during evaluation: bedside  Patient participation: complete - patient participated  Level of consciousness: sleepy but conscious  Pain score: 0  Pain management: adequate    Airway patency: patent  Anesthetic complications: No anesthetic complications    Cardiovascular status: acceptable  Respiratory status: acceptable  Hydration status: acceptable    Comments: /66   Pulse 59   Resp 16   Ht 177.8 cm (70\")   Wt (!) 140 kg (309 lb 11.2 oz)   SpO2 96%   BMI 44.44 kg/m²       "

## 2025-06-03 LAB
CYTO UR: NORMAL
DX PRELIMINARY: NORMAL
LAB AP CASE REPORT: NORMAL
LAB AP DIAGNOSIS COMMENT: NORMAL
PATH REPORT.FINAL DX SPEC: NORMAL
PATH REPORT.GROSS SPEC: NORMAL

## 2025-06-16 ENCOUNTER — TELEPHONE (OUTPATIENT)
Dept: GASTROENTEROLOGY | Facility: CLINIC | Age: 55
End: 2025-06-16
Payer: COMMERCIAL

## 2025-06-16 NOTE — TELEPHONE ENCOUNTER
Return patient's phone call. She states early Saturday morning she started with nausea, abdominal cramping, diarrhea, gas, and deep belches when smelled and tasted like Sulfur.   Most of her symptoms resolved by Sunday.     She states continues to have intermittent nausea and pain in her abdomen and the Famotidine is not helping.     Advised Dr. Boles has not signed off on her EGD results and will send him her health update. She verb understanding.

## 2025-06-16 NOTE — TELEPHONE ENCOUNTER
"    Caller: Ivania Mendoza \"Clara\"     Relationship: SELF    Best call back number: [phone]    What is your medical concern? PT HAS BEEN HAVING DIARRHEA, NAUSEA, CRAMPING, GAS, AND SULFER BELCHING. THE SULFER BELCHING IS A NEW SYMPTOM THAT STARTED ABOUT 3AM ON SATURDAY. PT HAS APPT IN 08/27/25 WITH [facility] BUT WPULD LIKE RELIEF BEFORE THEN. PLEASE CALL PT BACK AND ADVISE     How long has this issue been going on? OFF AND ON FOR YEARS    Is your provider already aware of this issue? YES    Have you been treated for this issue? YES EXCEPT THE BELCHING      "

## 2025-06-20 NOTE — TELEPHONE ENCOUNTER
"Prosper Boles MD to Ida Ledesma, RN  [facility] Clinical 2 Pool (Selected Message)        6/20/25  9:24 AM  No H pylori on EGD  What looks like some pill induced gastritis this can happen when medications sit in stomach too long.  I note pt is on GLP1, these medications can slow gastric emptying and produce many of the symptoms she is complaining of.   There is even a condition call \"ozempic burps\" which she may have.  She may need to talk to her prescribing doctor about alternatives.  Could also offer change of pepcid to protonix 40mg/day.  Make f/u with [facility] in 6-8 weeks. Thanks  "

## 2025-06-20 NOTE — TELEPHONE ENCOUNTER
Prosper Boles MD to Me (Selected Message)        6/20/25 11:08 AM  Then she can try pepcid 20mg BID OTC PRN

## 2025-06-20 NOTE — TELEPHONE ENCOUNTER
Called pt and advised of Dr Boles's note.  Pt verbalized understanding.     Pt states she has Stage 3 kidney disease and was told not to take PPI's.  Pt also states she has been on Mounjaro for 2 years and the sulfur burps happened for only 2 days and has now resolved.     Advised will send a msg to Dr Boles.

## (undated) DEVICE — CANN O2 ETCO2 FITS ALL CONN CO2 SMPL A/ 7IN DISP LF

## (undated) DEVICE — SENSR O2 OXIMAX FNGR A/ 18IN NONSTR

## (undated) DEVICE — BLCK/BITE BLOX W/DENTL/RIM W/STRAP 54F

## (undated) DEVICE — ADAPT CLN BIOGUARD AIR/H2O DISP

## (undated) DEVICE — KT ORCA ORCAPOD DISP STRL

## (undated) DEVICE — TUBING, SUCTION, 1/4" X 10', STRAIGHT: Brand: MEDLINE

## (undated) DEVICE — MSK ENDO PORT O2 POM ELITE CURAPLEX A/

## (undated) DEVICE — FRCP BX RADJAW4 NDL 2.8 240CM LG OG BX40

## (undated) DEVICE — LN SMPL CO2 SHTRM SD STREAM W/M LUER

## (undated) DEVICE — BITEBLOCK OMNI BLOC